# Patient Record
Sex: MALE | Race: WHITE | Employment: FULL TIME | URBAN - METROPOLITAN AREA
[De-identification: names, ages, dates, MRNs, and addresses within clinical notes are randomized per-mention and may not be internally consistent; named-entity substitution may affect disease eponyms.]

---

## 2022-12-31 ENCOUNTER — HOSPITAL ENCOUNTER (EMERGENCY)
Age: 37
Discharge: HOME | End: 2022-12-31
Attending: STUDENT IN AN ORGANIZED HEALTH CARE EDUCATION/TRAINING PROGRAM
Payer: COMMERCIAL

## 2022-12-31 VITALS
HEIGHT: 77 IN | TEMPERATURE: 98.7 F | DIASTOLIC BLOOD PRESSURE: 91 MMHG | WEIGHT: 240 LBS | BODY MASS INDEX: 28.34 KG/M2 | OXYGEN SATURATION: 98 % | HEART RATE: 115 BPM | RESPIRATION RATE: 18 BRPM | SYSTOLIC BLOOD PRESSURE: 147 MMHG

## 2022-12-31 DIAGNOSIS — L02.91 ABSCESS: Primary | ICD-10-CM

## 2022-12-31 PROCEDURE — 99283 EMERGENCY DEPT VISIT LOW MDM: CPT

## 2022-12-31 PROCEDURE — 74011000250 HC RX REV CODE- 250: Performed by: STUDENT IN AN ORGANIZED HEALTH CARE EDUCATION/TRAINING PROGRAM

## 2022-12-31 RX ORDER — SULFAMETHOXAZOLE AND TRIMETHOPRIM 800; 160 MG/1; MG/1
1 TABLET ORAL 2 TIMES DAILY
Qty: 14 TABLET | Refills: 0 | Status: SHIPPED | OUTPATIENT
Start: 2022-12-31 | End: 2023-01-07

## 2022-12-31 RX ADMIN — LIDOCAINE HYDROCHLORIDE 15 MG: 10; .005 INJECTION, SOLUTION EPIDURAL; INFILTRATION; INTRACAUDAL; PERINEURAL at 07:41

## 2022-12-31 NOTE — ED TRIAGE NOTES
Patient states Berto Causey may have a cyst in his groin area\". Patient states \"it has been getting worse within the past 48 hours\".

## 2022-12-31 NOTE — ED PROVIDER NOTES
HPI   Patient is a 68-year-old male who presents with pain right behind his scrotum. He states that he noticed a swelling or cyst in the area that started around 2 days ago and has been progressively worsening. Is not to the point where he cannot sit down. Pain is localized to that area. Feels like a severe pain. Does not radiate anywhere. Has not tried some topical creams which did not help. Tried some Motrin which did help. Has never had any like this before although does state the does get a recurrent pimple in this area sometimes. Denies any dysuria, hematuria or increased urinary frequency. Denies any pain with defecation. History reviewed. No pertinent past medical history. History reviewed. No pertinent surgical history. History reviewed. No pertinent family history. Social History     Socioeconomic History    Marital status: Not on file     Spouse name: Not on file    Number of children: Not on file    Years of education: Not on file    Highest education level: Not on file   Occupational History    Not on file   Tobacco Use    Smoking status: Every Day     Types: Cigarettes     Passive exposure: Never    Smokeless tobacco: Never   Vaping Use    Vaping Use: Never used   Substance and Sexual Activity    Alcohol use: Yes    Drug use: Never    Sexual activity: Yes     Birth control/protection: Condom   Other Topics Concern    Not on file   Social History Narrative    Not on file     Social Determinants of Health     Financial Resource Strain: Not on file   Food Insecurity: Not on file   Transportation Needs: Not on file   Physical Activity: Not on file   Stress: Not on file   Social Connections: Not on file   Intimate Partner Violence: Not on file   Housing Stability: Not on file         ALLERGIES: Patient has no known allergies.     Review of Systems  Constitutional: No fever  HENT: No ear pain  Eyes: No change in vision  Respiratory: No SOB  Cardio: No chest pain  GI: No blood in stool  : No hematuria  MSK: No back pain  Skin: No rashes  Neuro: No headache    Vitals:    12/31/22 0713   BP: (!) 147/91   Pulse: (!) 115   Resp: 18   Temp: 98.7 °F (37.1 °C)   SpO2: 98%   Weight: 108.9 kg (240 lb)   Height: 6' 5\" (1.956 m)            Physical Exam   General: No acute distress  Head: Normocephalic, atraumatic  Psych: Cooperative and alert  Eyes: No scleral icterus, normal conjunctiva  ENT: Moist oral mucosa  Neck: Supple  CV: Regular rate and rhythm  Pulm: Clear breath sounds bilaterally without any wheezing or rhonchi, normal respiratory rate  GI: Normal bowel sounds, soft, non-tender  : Normal male genitalia, just behind the scrotum there is a large area of fluctuance with 2 pustules, tender to palpation with some mild surrounding erythema  MSK: Moves all four extremities  Skin: No rashes  Neuro: Alert and conversive    MDM    Patient is a 19-year-old male presenting with an abscess to his perineum just behind his scrotum. Does not appear connected to the scrotum appears more in the gluteal region. Discussed finding with the patient and the need for incision and drainage. Procedure note: Incision and drainage  Area was anesthetized with lidocaine with epinephrine. Then using 11 blade scalpel an incision was made into the abscess. There is a large amount of purulent fluid that was removed. Loculations were broken up. Patient tolerated procedure well. Discussed the importance of sitz bath's. Patient's pain is significantly improved following procedure. Patient stable for discharge at this time. Patient is in agreement with the plan to be discharged at this time. All the patient's questions were answered. Patient was given written instructions on the diagnosis, and states understanding of the plan moving forward. We did discuss important signs and symptoms that should prompt quick return to the emergency department.     Disposition: Patient was discharged home in stable condition. They will follow up with their primary care physician or surgery    Prescriptions:  Bactrim    Diagnosis: Acute perineal abscess    Procedures

## 2022-12-31 NOTE — DISCHARGE INSTRUCTIONS
The most important thing to get this to heal is to do sitz bath. All that entails is sitting your butt in warm water 3 times a day.   If you are unable to do this then try warm compresses, however this is the most important thing to get it to heal.  If the pressure starts to build up again you may need to have it be drained and if this happens please return to the emergency department or follow-up with surgery

## 2023-01-03 ENCOUNTER — TELEPHONE (OUTPATIENT)
Dept: SURGERY | Age: 38
End: 2023-01-03

## 2023-01-03 NOTE — TELEPHONE ENCOUNTER
Spoke to Mr. Lewis regarding referral received from ED to schedule an appointment with Dr. Chele Collins.  Fabio Edawrds indicate he's on his way home - [place], [place] and was seen for emergent care while visiting will plan to follow up with his physician.